# Patient Record
Sex: FEMALE | Race: WHITE | Employment: PART TIME | URBAN - NONMETROPOLITAN AREA
[De-identification: names, ages, dates, MRNs, and addresses within clinical notes are randomized per-mention and may not be internally consistent; named-entity substitution may affect disease eponyms.]

---

## 2024-02-28 ENCOUNTER — HOSPITAL ENCOUNTER (OUTPATIENT)
Dept: PHYSICAL THERAPY | Age: 49
Setting detail: THERAPIES SERIES
Discharge: HOME | End: 2024-02-28
Payer: COMMERCIAL

## 2024-02-28 PROCEDURE — 97161 PT EVAL LOW COMPLEX 20 MIN: CPT

## 2024-02-28 PROCEDURE — 97530 THERAPEUTIC ACTIVITIES: CPT

## 2024-02-28 PROCEDURE — 97110 THERAPEUTIC EXERCISES: CPT

## 2024-02-28 PROCEDURE — 97112 NEUROMUSCULAR REEDUCATION: CPT

## 2024-02-28 NOTE — PROGRESS NOTES
Limitations: PFM weakness with decreased localization and endurance, Decreased awareness of functional factors that increase pelvic organ strain, Decreased awareness of normal bladder habits, control, and diet effects on functioning, Abdominal and core weakness, and Impaired strength  Prognosis: Excellent    GOALS:  Patient Goal: to have less urinary urgency and leaking    Short Term Goals: 6 weeks  Pt will be able to identify normal bladder function/voiding patterns, diet impact on the bladder, and urge control strategies to enhance pt insight into potential causative factors and promote increased bladder control.     The pt will demonstrate well localized PFM contraction in order to increase the efficacy of strengthening program.   Pt will demonstrate the ability to delay urgency for 5' with good control to enable time to get to the bathroom.    Pt will demonstrate independence with basic HEP designed to increase PFM and core strength and to enhance hip girdle flexibility for increased ease of strengthening.   5.  This pt will demo proper use of pelvic brace lifting, push, and pull with pelvic brace in order to promote continence during functional tasks.        Long Term Goals: 12 weeks  This pt will report a 90% decrease in incontinence frequency an amount to increase comfort in public and reduce risk of integumentary compromise.   This pt will report normal urinary frequency to enabling ease of work/home task completion without interruption.    This pt will reduce pad usage by 100% in order to reduce financial strain and promote comfort in public situations.  This pt will decrease IIQ and IUD scores to 3 to indicate improved continence and efficacy of treatment  This pt will demo independence with advanced HEP in order to allow gains in therapy to be maintained long term and reduce the risk of further medical need/assessment.    This pt will demo PFM PERF 3/10/10/10 in order to increase continence in functional

## 2024-03-20 ENCOUNTER — HOSPITAL ENCOUNTER (OUTPATIENT)
Dept: PHYSICAL THERAPY | Age: 49
Setting detail: THERAPIES SERIES
Discharge: HOME | End: 2024-03-20
Payer: COMMERCIAL

## 2024-03-20 PROCEDURE — 97530 THERAPEUTIC ACTIVITIES: CPT

## 2024-03-20 PROCEDURE — 97112 NEUROMUSCULAR REEDUCATION: CPT

## 2024-03-20 PROCEDURE — 97110 THERAPEUTIC EXERCISES: CPT

## 2024-03-20 NOTE — PROGRESS NOTES
[facility]  PHYSICAL THERAPY  OUTPATIENT REHABILITATION - SPECIALIZED THERAPY SERVICES  [] PELVIC HEALTH EVALUATION  [x] DAILY NOTE  [] PROGRESS NOTE [] DISCHARGE NOTE    Date: 3/20/2024  Patient Name:  Jaci Michelle  : 1975  MRN: [id number]  CSN: [id number]    Referring Practitioner Jacey Joseph MD   Diagnosis Other specified postprocedural states   Treatment Diagnosis M62.81 - Muscle Weakness (Generalized) and R39.15 - Urgency of Urination  N39.41 - Urge Incontinence   Date of Evaluation 24    Additional Pertinent History Pt does have gela in the back due to scoliosis      Functional Outcome Measure Used IIQ and MANUEL   Functional Outcome Score 7 and 7 (24)       Insurance: Primary: Payor: ERIN /  /  / ,   Secondary:    Authorization Information: No precert required   Visit # 2, 2/10 for progress note (Reporting Period: 24 to     )   Visits Allowed: Allowed 60 visits of OT, PT, and ST COMBINED per calendar year.  0 visits have been used.. Hard Max..    Recertification Date: 24   Physician Follow-Up: 3/6/24   Physician Orders:    History of Present Illness: Pt referred to PT s/p hysterectomy with large uterine fibroid on 24. Pt reports that since the hysterectomy she has been dealing with urinary urgency and urge incontinence since the surgery.      SUBJECTIVE: Pt reports that she has been complaint with kegel program. Reports that she has been getting exercises done about 4 times a day without any complaints of pain or soreness. Had a follow up on 3/6, now has a 25# lifting restriction and was cleared to go back to work. Has returned to work without issue. States that urinary urgency has continued to be an issue korin if she has had a lot to drink. Has had some continued urinary leaking with urgency if waiting too long to void. No longer feels the need to wear a panty liner.    Social/Functional History and Current Status:  Medications and

## 2024-04-03 ENCOUNTER — HOSPITAL ENCOUNTER (OUTPATIENT)
Dept: PHYSICAL THERAPY | Age: 49
Setting detail: THERAPIES SERIES
Discharge: HOME | End: 2024-04-03
Payer: COMMERCIAL

## 2024-04-03 PROCEDURE — 97110 THERAPEUTIC EXERCISES: CPT

## 2024-04-03 PROCEDURE — 97530 THERAPEUTIC ACTIVITIES: CPT

## 2024-04-03 NOTE — PROGRESS NOTES
[facility]  PHYSICAL THERAPY  OUTPATIENT REHABILITATION - SPECIALIZED THERAPY SERVICES  [] PELVIC HEALTH EVALUATION  [x] DAILY NOTE  [] PROGRESS NOTE [] DISCHARGE NOTE    Date: 4/3/2024  Patient Name:  Jaci Michelle  : 1975  MRN: [id number]  CSN: [id number]    Referring Practitioner Jacey Joseph MD   Diagnosis Other specified postprocedural states   Treatment Diagnosis M62.81 - Muscle Weakness (Generalized) and R39.15 - Urgency of Urination  N39.41 - Urge Incontinence   Date of Evaluation 24    Additional Pertinent History Pt does have gela in the back due to scoliosis      Functional Outcome Measure Used IIQ and MANUEL   Functional Outcome Score 7 and 7 (24)       Insurance: Primary: Payor: ERIN /  /  / ,   Secondary:    Authorization Information: No precert required   Visit # 3, 3/10 for progress note (Reporting Period: 24 to     )   Visits Allowed: Allowed 60 visits of OT, PT, and ST COMBINED per calendar year.  0 visits have been used.. Hard Max..    Recertification Date: 24   Physician Follow-Up: 3/6/24   Physician Orders:    History of Present Illness: Pt referred to PT s/p hysterectomy with large uterine fibroid on 24. Pt reports that since the hysterectomy she has been dealing with urinary urgency and urge incontinence since the surgery.      SUBJECTIVE: Pt reports that she has continued to not wear a panty liner. Has had 1 leak with waking in AM and when making her way to the bathroom had a leak occur on the way there. Has returned to work without complaints.  Has had reduced urinary urgency at work as she has been better about not prolonging void times when working. Had some pain on 1 occasion with performance of exercises with the pain being at the rectum, skipped kegels for 1 day and when she started again the next day the pain had resolved. States that she has been trying to track void volumes and typically voids for about 17 seconds

## 2024-04-10 ENCOUNTER — HOSPITAL ENCOUNTER (OUTPATIENT)
Dept: PHYSICAL THERAPY | Age: 49
Setting detail: THERAPIES SERIES
Discharge: HOME | End: 2024-04-10
Payer: COMMERCIAL

## 2024-04-10 PROCEDURE — 97110 THERAPEUTIC EXERCISES: CPT

## 2024-04-10 PROCEDURE — 97530 THERAPEUTIC ACTIVITIES: CPT

## 2024-04-10 PROCEDURE — 97112 NEUROMUSCULAR REEDUCATION: CPT

## 2024-04-10 NOTE — PROGRESS NOTES
[facility]  PHYSICAL THERAPY  OUTPATIENT REHABILITATION - SPECIALIZED THERAPY SERVICES  [] PELVIC HEALTH EVALUATION  [x] DAILY NOTE  [] PROGRESS NOTE [] DISCHARGE NOTE    Date: 4/10/2024  Patient Name:  Jaci Michelle  : 1975  MRN: [id number]  CSN: [id number]    Referring Practitioner Jacey Joseph MD   Diagnosis Other specified postprocedural states   Treatment Diagnosis M62.81 - Muscle Weakness (Generalized) and R39.15 - Urgency of Urination  N39.41 - Urge Incontinence   Date of Evaluation 24    Additional Pertinent History Pt does have gela in the back due to scoliosis      Functional Outcome Measure Used IIQ and MANUEL   Functional Outcome Score 7 and 7 (24)       Insurance: Primary: Payor: BENJAMIN /  /  / ,   Secondary:    Authorization Information: No precert required   Visit # 3, 3/10 for progress note (Reporting Period: 24 to     )   Visits Allowed: Allowed 60 visits of OT, PT, and ST COMBINED per calendar year.  0 visits have been used.. Hard Max..    Recertification Date: 24   Physician Follow-Up: 3/6/24   Physician Orders:    History of Present Illness: Pt referred to PT s/p hysterectomy with large uterine fibroid on 24. Pt reports that since the hysterectomy she has been dealing with urinary urgency and urge incontinence since the surgery.      SUBJECTIVE: Pt reports that exercises are going well. States that she did have some vaginal soreness on 1 day but it went away quickly. States that she has only had 1 leak since last PT session, states that it happened in the AM when she woke up. Denies any urinary urgency as long as she does not wait too long to void. States that after abdominal massage last session she had a bowel movement the next day. Has also been using abdominal massage at home and has been able to have a bowel movement within the next few hours.     that she has continued to not wear a panty liner. Has had 1 leak with waking in

## 2024-04-24 ENCOUNTER — HOSPITAL ENCOUNTER (OUTPATIENT)
Dept: PHYSICAL THERAPY | Age: 49
Setting detail: THERAPIES SERIES
Discharge: HOME | End: 2024-04-24
Payer: COMMERCIAL

## 2024-04-24 PROCEDURE — 97530 THERAPEUTIC ACTIVITIES: CPT

## 2024-04-24 PROCEDURE — 97110 THERAPEUTIC EXERCISES: CPT

## 2024-04-24 NOTE — DISCHARGE SUMMARY
interruption.  GOAL MET  This pt will reduce pad usage by 100% in order to reduce financial strain and promote comfort in public situations. GOAL MET  This pt will decrease IIQ and IUD scores to 3 to indicate improved continence and efficacy of treatment GOAL MET  This pt will demo independence with advanced HEP in order to allow gains in therapy to be maintained long term and reduce the risk of further medical need/assessment.  GOAL MET  This pt will demo PFM PERF 3/10/10/10 in order to increase continence in functional positions during home tasks. GOAL MET  This pt will demo 5/5 strength of the core with habitual inferior to superior contraction in order to increase pelvic organ support during cough/sneeze/lift and to promote continence via effective pelvic brace.  GOAL MET  The pt will abolish nocturia in order to attain restful sleep patterns.  GOAL MET        PLAN:  Treatment Recommendations: Strengthening, Range of Motion, Functional Mobility Training, Transfer Training, Endurance Training, Neuromuscular Re-education, Manual Therapy - Soft Tissue Mobilization, Pain Management, Home Exercise Program, and Patient Education    []  Plan of care initiated.  Plan to see patient 1 time every other week for 5-6 visits to address the treatment planned outlined above.  []  Continue with current plan of care  []  Modify plan of care as follows:   []  Hold pending physician visit  [x]  Discharge    Time In 10:00   Time Out 11:00   Timed Code Minutes: 60 min   Total Treatment Time: 60 min       Electronically Signed by: Lakeshia Farley, PT